# Patient Record
Sex: FEMALE | Race: BLACK OR AFRICAN AMERICAN | URBAN - METROPOLITAN AREA
[De-identification: names, ages, dates, MRNs, and addresses within clinical notes are randomized per-mention and may not be internally consistent; named-entity substitution may affect disease eponyms.]

---

## 2021-02-09 ENCOUNTER — APPOINTMENT (RX ONLY)
Dept: URBAN - METROPOLITAN AREA CLINIC 34 | Facility: CLINIC | Age: 36
Setting detail: DERMATOLOGY
End: 2021-02-09

## 2021-02-09 DIAGNOSIS — L65.9 NONSCARRING HAIR LOSS, UNSPECIFIED: ICD-10-CM

## 2021-02-09 PROCEDURE — ? COUNSELING

## 2021-02-09 PROCEDURE — 11104 PUNCH BX SKIN SINGLE LESION: CPT

## 2021-02-09 PROCEDURE — ? ADDITIONAL NOTES

## 2021-02-09 PROCEDURE — ? BIOPSY BY PUNCH METHOD

## 2021-02-09 ASSESSMENT — LOCATION ZONE DERM: LOCATION ZONE: SCALP

## 2021-02-09 ASSESSMENT — LOCATION DETAILED DESCRIPTION DERM: LOCATION DETAILED: LEFT SUPERIOR OCCIPITAL SCALP

## 2021-02-09 ASSESSMENT — LOCATION SIMPLE DESCRIPTION DERM: LOCATION SIMPLE: POSTERIOR SCALP

## 2021-02-09 NOTE — PROCEDURE: ADDITIONAL NOTES
Additional Notes: Patient notes that 10 years ago when they noticed hair loss they don’t remember any burning or itching in the scalp. Patient notes that they are looking for natural hair growth help. Dr. Chantel Womack notes that follicles are inflamed. Dr. Chantel Womack notes that to determine exact form of hair loss, a biopsy can be preformed. \\Agustina Womack notes that after childbirth patient was experiencing telogen effluvium.
Detail Level: Simple
Render Risk Assessment In Note?: no

## 2021-02-09 NOTE — HPI: HAIR LOSS (ALOPECIA AREATA)
How Severe Is It?: moderate
Is This A New Presentation, Or A Follow-Up?: Hair Loss
Additional History: Patient was diagnosed with alopecia and wanted a second option on type of alopecia patient, patient is concerned with long term minoxidil usage. Patient notes having sister locs and discontinued minoxidil compound. Patient notes increased shedding after childbirth two years ago.

## 2021-02-23 ENCOUNTER — APPOINTMENT (RX ONLY)
Dept: URBAN - METROPOLITAN AREA CLINIC 34 | Facility: CLINIC | Age: 36
Setting detail: DERMATOLOGY
End: 2021-02-23

## 2021-02-23 DIAGNOSIS — L66.11 CLASSIC LICHEN PLANOPILARIS: ICD-10-CM

## 2021-02-23 DIAGNOSIS — L65.8 OTHER SPECIFIED NONSCARRING HAIR LOSS: ICD-10-CM

## 2021-02-23 DIAGNOSIS — L66.1 LICHEN PLANOPILARIS: ICD-10-CM

## 2021-02-23 PROCEDURE — ? TREATMENT REGIMEN

## 2021-02-23 PROCEDURE — ? COUNSELING

## 2021-02-23 PROCEDURE — ? PRESCRIPTION

## 2021-02-23 PROCEDURE — ? ADDITIONAL NOTES

## 2021-02-23 PROCEDURE — 99213 OFFICE O/P EST LOW 20 MIN: CPT

## 2021-02-23 RX ORDER — DOXYCYCLINE HYCLATE 100 MG/1
CAPSULE, GELATIN COATED ORAL
Qty: 30 | Refills: 2 | Status: ERX | COMMUNITY
Start: 2021-02-23

## 2021-02-23 RX ADMIN — DOXYCYCLINE HYCLATE: 100 CAPSULE, GELATIN COATED ORAL at 00:00

## 2021-02-23 ASSESSMENT — LOCATION SIMPLE DESCRIPTION DERM
LOCATION SIMPLE: RIGHT SCALP
LOCATION SIMPLE: POSTERIOR SCALP

## 2021-02-23 ASSESSMENT — LOCATION ZONE DERM: LOCATION ZONE: SCALP

## 2021-02-23 ASSESSMENT — LOCATION DETAILED DESCRIPTION DERM
LOCATION DETAILED: RIGHT MEDIAL FRONTAL SCALP
LOCATION DETAILED: POSTERIOR MID-PARIETAL SCALP

## 2021-02-23 NOTE — PROCEDURE: ADDITIONAL NOTES
Additional Notes: Notes patient is experiencing inflammation on the scalp, which results in a scarring form of hair loss. Counsels pt about oral antibiotics to reduce inflammation, topical minoxidil compounded with topical anti inflammatories, kenalog injections, and cosmetic PRP. Also recommends for patient to start hair vitamins that will play a role in reducing inflammation.
Detail Level: Simple
Render Risk Assessment In Note?: no

## 2021-02-23 NOTE — PROCEDURE: TREATMENT REGIMEN
Detail Level: Zone
Initiate Treatment: Minoxidil compound nightly\\nDoxycycline 100mg daily for 3 months
Otc Regimen: Proscriptix vitamins

## 2022-11-28 ENCOUNTER — APPOINTMENT (RX ONLY)
Dept: URBAN - METROPOLITAN AREA CLINIC 41 | Facility: CLINIC | Age: 37
Setting detail: DERMATOLOGY
End: 2022-11-28

## 2022-11-28 DIAGNOSIS — L72.8 OTHER FOLLICULAR CYSTS OF THE SKIN AND SUBCUTANEOUS TISSUE: ICD-10-CM | Status: INADEQUATELY CONTROLLED

## 2022-11-28 DIAGNOSIS — L82.0 INFLAMED SEBORRHEIC KERATOSIS: ICD-10-CM | Status: INADEQUATELY CONTROLLED

## 2022-11-28 PROBLEM — D48.5 NEOPLASM OF UNCERTAIN BEHAVIOR OF SKIN: Status: ACTIVE | Noted: 2022-11-28

## 2022-11-28 PROCEDURE — 99202 OFFICE O/P NEW SF 15 MIN: CPT

## 2022-11-28 PROCEDURE — ? ORDER TESTS

## 2022-11-28 PROCEDURE — ? COUNSELING

## 2022-11-28 PROCEDURE — ? ADDITIONAL NOTES

## 2022-11-28 ASSESSMENT — LOCATION DETAILED DESCRIPTION DERM
LOCATION DETAILED: LEFT SUPERIOR ANTERIOR NECK
LOCATION DETAILED: RIGHT INFERIOR CENTRAL MALAR CHEEK
LOCATION DETAILED: RIGHT SUPERIOR LATERAL NECK
LOCATION DETAILED: LEFT INFERIOR CENTRAL MALAR CHEEK

## 2022-11-28 ASSESSMENT — LOCATION ZONE DERM
LOCATION ZONE: NECK
LOCATION ZONE: FACE

## 2022-11-28 ASSESSMENT — LOCATION SIMPLE DESCRIPTION DERM
LOCATION SIMPLE: LEFT CHEEK
LOCATION SIMPLE: RIGHT ANTERIOR NECK
LOCATION SIMPLE: RIGHT CHEEK
LOCATION SIMPLE: LEFT ANTERIOR NECK

## 2022-11-28 NOTE — PROCEDURE: ORDER TESTS
Bill For Surgical Tray: no
Performing Laboratory: 0
Expected Date Of Service: 11/28/2022
Billing Type: Third-Party Bill

## 2022-11-28 NOTE — HPI: CYST
Is This A New Presentation, Or A Follow-Up?: Cyst
Additional History: This is a new patient here for a bump under skin of armpit she noticed 2-3 weeks  ago which has shrunk since she noted it. She has not treated it.

## 2022-11-28 NOTE — HPI: SKIN LESION
What Type Of Note Output Would You Prefer (Optional)?: Bullet Format
Is This A New Presentation, Or A Follow-Up?: Skin Lesion
Additional History: This is a new patient, established [facility] patient here for skin tags all over face and neck she noticed during her pregnancy (gave birth nov 3). She would like them frozen off.

## 2022-11-28 NOTE — PROCEDURE: COUNSELING
Detail Level: Detailed
Patient Specific Counseling (Will Not Stick From Patient To Patient): NG explains we can treat these but since pt is breastfeeding, NG explains that sometimes we are concerned that if we remove something and an infection arises we worry about oral abx and breastfeeding. Pt decides to rto for tx.
Patient Specific Counseling (Will Not Stick From Patient To Patient): Pt denies mastitis or pain/lumps in breast. Pt has not been shaving since she noticed the spot. Pt denies hyperhidrosis. Pt denies cysts or boils in groin area, notes some bumps from shaving in the past that quickly went away. NG explains this could be HS and we would advise BW.\\n\\nNG explains that HS can be triggered by hormonal fluctuations due to pregnancy/breastfeeding.\\n\\nPt notes she had postpartum eclampsia. Pt notes she has had recent BW. NG explains that pt can have them send it over to us. Pt notes her BP is very high now and has never been before. \\n\\nNG palpates lymph nodes on Pt neck. Pt denies fevers, chills, headaches.\\n\\nNG explains humira is the treatment if it does not calm down after hormonal fluctuations subside, but cannot happen during breastfeeding or pregnancy.